# Patient Record
Sex: MALE | Race: BLACK OR AFRICAN AMERICAN | NOT HISPANIC OR LATINO | URBAN - METROPOLITAN AREA
[De-identification: names, ages, dates, MRNs, and addresses within clinical notes are randomized per-mention and may not be internally consistent; named-entity substitution may affect disease eponyms.]

---

## 2022-11-08 ENCOUNTER — APPOINTMENT (RX ONLY)
Dept: URBAN - METROPOLITAN AREA CLINIC 117 | Facility: CLINIC | Age: 47
Setting detail: DERMATOLOGY
End: 2022-11-08

## 2022-11-08 DIAGNOSIS — L40.3 PUSTULOSIS PALMARIS ET PLANTARIS: ICD-10-CM | Status: INADEQUATELY CONTROLLED

## 2022-11-08 PROCEDURE — ? COUNSELING

## 2022-11-08 PROCEDURE — ? ORDER TESTS

## 2022-11-08 PROCEDURE — ? OTHER

## 2022-11-08 PROCEDURE — 99204 OFFICE O/P NEW MOD 45 MIN: CPT

## 2022-11-08 ASSESSMENT — LOCATION ZONE DERM
LOCATION ZONE: HAND
LOCATION ZONE: FEET

## 2022-11-08 ASSESSMENT — LOCATION SIMPLE DESCRIPTION DERM
LOCATION SIMPLE: RIGHT HAND
LOCATION SIMPLE: LEFT HAND
LOCATION SIMPLE: RIGHT PLANTAR SURFACE
LOCATION SIMPLE: LEFT PLANTAR SURFACE

## 2022-11-08 ASSESSMENT — LOCATION DETAILED DESCRIPTION DERM
LOCATION DETAILED: RIGHT RADIAL PALM
LOCATION DETAILED: LEFT INSTEP
LOCATION DETAILED: RIGHT MEDIAL PLANTAR MIDFOOT
LOCATION DETAILED: LEFT RADIAL PALM

## 2022-11-08 NOTE — PROCEDURE: OTHER
Note Text (......Xxx Chief Complaint.): This diagnosis correlates with the
Other (Free Text): According to Erasto on 29th, the patient has not picked up either clobetasol or protopic rx'd by his PMD.
Detail Level: Zone
Render Risk Assessment In Note?: no

## 2022-11-08 NOTE — PROCEDURE: ORDER TESTS
Billing Type: Third-Party Bill
Bill For Surgical Tray: no
Performing Laboratory: 0
Expected Date Of Service: 11/08/2022

## 2022-11-08 NOTE — HPI: RASH
What Type Of Note Output Would You Prefer (Optional)?: Bullet Format
Is The Patient Presenting As Previously Scheduled?: Yes
How Severe Is Your Rash?: mild
Is This A New Presentation, Or A Follow-Up?: Rash
Additional History: Patient states he has a rash on his hands. It comes and goes and sometimes spreads to other parts of the body. \\nHas been told it is psoriasis. Steroids clear it up temporarily but rash returns. Patient has been trying clobetasol cream, and tacrolimus.

## 2022-11-10 ENCOUNTER — APPOINTMENT (RX ONLY)
Dept: URBAN - METROPOLITAN AREA CLINIC 117 | Facility: CLINIC | Age: 47
Setting detail: DERMATOLOGY
End: 2022-11-10

## 2022-11-10 ENCOUNTER — RX ONLY (OUTPATIENT)
Age: 47
Setting detail: RX ONLY
End: 2022-11-10

## 2022-11-10 DIAGNOSIS — L40.0 PSORIASIS VULGARIS: ICD-10-CM

## 2022-11-10 PROBLEM — L30.9 DERMATITIS, UNSPECIFIED: Status: ACTIVE | Noted: 2022-11-10

## 2022-11-10 PROCEDURE — ? PRESCRIPTION MEDICATION MANAGEMENT

## 2022-11-10 PROCEDURE — ? ORDER TESTS

## 2022-11-10 PROCEDURE — ? COUNSELING

## 2022-11-10 PROCEDURE — 11104 PUNCH BX SKIN SINGLE LESION: CPT

## 2022-11-10 PROCEDURE — ? BIOPSY BY PUNCH METHOD

## 2022-11-10 RX ORDER — MINOCYCLINE HYDROCHLORIDE 100 MG/1
CAPSULE ORAL
Qty: 60 | Refills: 2 | Status: ERX | COMMUNITY
Start: 2022-11-10

## 2022-11-10 ASSESSMENT — LOCATION SIMPLE DESCRIPTION DERM
LOCATION SIMPLE: RIGHT FOOT
LOCATION SIMPLE: LEFT FOOT

## 2022-11-10 ASSESSMENT — LOCATION DETAILED DESCRIPTION DERM
LOCATION DETAILED: RIGHT MEDIAL HEEL
LOCATION DETAILED: LEFT MEDIAL DORSAL FOOT
LOCATION DETAILED: RIGHT MEDIAL DORSAL FOOT

## 2022-11-10 ASSESSMENT — LOCATION ZONE DERM: LOCATION ZONE: FEET

## 2022-11-10 NOTE — PROCEDURE: ORDER TESTS
Billing Type: Third-Party Bill
Expected Date Of Service: 11/10/2022
Performing Laboratory: 0
Bill For Surgical Tray: no

## 2022-11-10 NOTE — PROCEDURE: PRESCRIPTION MEDICATION MANAGEMENT
Initiate Treatment: Rx Minocycline 100mg BID x 2 weeks then QD x 2 weeks
Detail Level: Zone
Render In Strict Bullet Format?: No